# Patient Record
Sex: MALE | Race: WHITE | NOT HISPANIC OR LATINO | Employment: STUDENT | URBAN - NONMETROPOLITAN AREA
[De-identification: names, ages, dates, MRNs, and addresses within clinical notes are randomized per-mention and may not be internally consistent; named-entity substitution may affect disease eponyms.]

---

## 2023-10-20 ENCOUNTER — APPOINTMENT (OUTPATIENT)
Dept: PRIMARY CARE | Facility: CLINIC | Age: 33
End: 2023-10-20

## 2023-10-20 ENCOUNTER — OFFICE VISIT (OUTPATIENT)
Dept: PRIMARY CARE | Facility: CLINIC | Age: 33
End: 2023-10-20

## 2023-10-20 VITALS
SYSTOLIC BLOOD PRESSURE: 132 MMHG | RESPIRATION RATE: 14 BRPM | HEART RATE: 80 BPM | OXYGEN SATURATION: 97 % | WEIGHT: 235.6 LBS | HEIGHT: 72 IN | BODY MASS INDEX: 31.91 KG/M2 | DIASTOLIC BLOOD PRESSURE: 84 MMHG | TEMPERATURE: 98.5 F

## 2023-10-20 DIAGNOSIS — Z00.00 HEALTH MAINTENANCE EXAMINATION: Primary | ICD-10-CM

## 2023-10-20 PROCEDURE — 99385 PREV VISIT NEW AGE 18-39: CPT

## 2023-10-20 PROCEDURE — 1036F TOBACCO NON-USER: CPT

## 2023-10-20 NOTE — PROGRESS NOTES
Subjective   Patient ID: Yana Vazquez is a 33 y.o. male who presents for Annual Exam.  HPI  33 year old male with no significant PMH presents today to establish care and for CPE.   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     PMH: none  PSH:  none  PFH: denies    Exercise: stretching, walking   Nicotine: none  ETOH: none  Drug use: none  Dental care: UTD  Vision concerns: No  Hearing concerns: None    All systems have been reviewed and are negative for complaint other than those mentioned in the HPI.     /84   Pulse 80   Temp 36.9 °C (98.5 °F)   Resp 14   Ht 1.829 m (6')   Wt 107 kg (235 lb 9.6 oz)   SpO2 97%   BMI 31.95 kg/m²    Objective   Physical Exam  Constitutional:       General: He is awake.      Appearance: Normal appearance.   HENT:      Head: Normocephalic and atraumatic.   Eyes:      Extraocular Movements: Extraocular movements intact.      Conjunctiva/sclera: Conjunctivae normal.      Pupils: Pupils are equal, round, and reactive to light.   Neck:      Thyroid: No thyroid mass or thyromegaly.      Vascular: No carotid bruit.   Cardiovascular:      Rate and Rhythm: Normal rate and regular rhythm.      Pulses: Normal pulses.      Heart sounds: S1 normal and S2 normal. No murmur heard.  Pulmonary:      Effort: Pulmonary effort is normal.      Breath sounds: Normal breath sounds.   Abdominal:      General: Abdomen is flat. Bowel sounds are normal.      Palpations: Abdomen is soft. There is no hepatomegaly, splenomegaly, mass or pulsatile mass.      Tenderness: There is no abdominal tenderness.   Musculoskeletal:      Cervical back: Normal range of motion and neck supple.      Right lower leg: No edema.      Left lower leg: No edema.   Skin:     General: Skin is warm and dry.      Capillary Refill: Capillary refill takes less than 2 seconds.   Neurological:      General: No focal deficit present.      Mental Status: He is alert and oriented to person, place, and  time.   Psychiatric:         Mood and Affect: Mood normal.         Behavior: Behavior normal. Behavior is cooperative.         Thought Content: Thought content normal.         Judgment: Judgment normal.     Yana was seen today for annual exam.  Diagnoses and all orders for this visit:  Health maintenance examination (Primary)   - Declining HM blood work today   - UTD vaccines   - Discussed healthy diet and exercise   - Medically cleared for dental residency